# Patient Record
Sex: MALE | Race: WHITE | URBAN - METROPOLITAN AREA
[De-identification: names, ages, dates, MRNs, and addresses within clinical notes are randomized per-mention and may not be internally consistent; named-entity substitution may affect disease eponyms.]

---

## 2018-03-12 ENCOUNTER — HOSPITAL ENCOUNTER (OUTPATIENT)
Facility: AMBULATORY SURGERY CENTER | Age: 63
Discharge: HOME | End: 2018-03-12
Attending: SPECIALIST | Admitting: SPECIALIST
Payer: COMMERCIAL

## 2018-03-12 ENCOUNTER — SURGERY (OUTPATIENT)
Age: 63
End: 2018-03-12

## 2018-03-12 VITALS
BODY MASS INDEX: 26.22 KG/M2 | RESPIRATION RATE: 16 BRPM | WEIGHT: 173 LBS | TEMPERATURE: 98.5 F | HEIGHT: 68 IN | OXYGEN SATURATION: 96 % | SYSTOLIC BLOOD PRESSURE: 138 MMHG | DIASTOLIC BLOOD PRESSURE: 96 MMHG

## 2018-03-12 LAB — COLONOSCOPY: NORMAL

## 2018-03-12 PROCEDURE — G8907 PT DOC NO EVENTS ON DISCHARG: HCPCS

## 2018-03-12 PROCEDURE — G8918 PT W/O PREOP ORDER IV AB PRO: HCPCS

## 2018-03-12 PROCEDURE — 45378 DIAGNOSTIC COLONOSCOPY: CPT

## 2018-03-12 RX ORDER — FENTANYL CITRATE 50 UG/ML
INJECTION, SOLUTION INTRAMUSCULAR; INTRAVENOUS PRN
Status: DISCONTINUED | OUTPATIENT
Start: 2018-03-12 | End: 2018-03-12 | Stop reason: HOSPADM

## 2018-03-12 RX ORDER — LIDOCAINE 40 MG/G
CREAM TOPICAL
Status: DISCONTINUED | OUTPATIENT
Start: 2018-03-12 | End: 2018-03-13 | Stop reason: HOSPADM

## 2018-03-12 RX ORDER — ONDANSETRON 2 MG/ML
4 INJECTION INTRAMUSCULAR; INTRAVENOUS
Status: DISCONTINUED | OUTPATIENT
Start: 2018-03-12 | End: 2018-03-13 | Stop reason: HOSPADM

## 2018-03-12 RX ADMIN — FENTANYL CITRATE 50 MCG: 50 INJECTION, SOLUTION INTRAMUSCULAR; INTRAVENOUS at 09:47

## 2018-03-12 RX ADMIN — FENTANYL CITRATE 50 MCG: 50 INJECTION, SOLUTION INTRAMUSCULAR; INTRAVENOUS at 09:50

## 2018-03-12 NOTE — H&P
Pre-Endoscopy History and Physical     Catracho Mcbride MRN# [id number]   YOB: 1955 Age: 62 year old     Date of Procedure: 3/12/2018  Primary care provider: Alhaji Felder  Type of Endoscopy: Colonoscopy with possible biopsy, possible polypectomy  Reason for Procedure: shistory of polyps  Type of Anesthesia Anticipated: Conscious Sedation    HPI:    Catracho is a 62 year old male who will be undergoing the above procedure.      A history and physical has been performed. The patient's medications and allergies have been reviewed. The risks and benefits of the procedure and the sedation options and risks were discussed with the patient.  All questions were answered and informed consent was obtained.      He denies a personal or family history of anesthesia complications or bleeding disorders.     There is no problem list on file for this patient.       Past Medical History:   Diagnosis Date     Asthma      Hypertension         Past Surgical History:   Procedure Laterality Date     COLONOSCOPY  3/15/2013    Procedure: COLONOSCOPY;  Colonoscopy, follow-up polyps;  Surgeon: Basilio Solis MD;  Location:  OR       Social History   Substance Use Topics     Smoking status: Never Smoker     Smokeless tobacco: Never Used     Alcohol use Yes      Comment: 1 per day        History reviewed. No pertinent family history.    Prior to Admission medications    Medication Sig Start Date End Date Taking? Authorizing Provider   ASPIRIN ADULT LOW STRENGTH PO Take 81 mg by mouth   Yes Reported, Patient   FINASTERIDE PO Take 5 mg by mouth   Yes Reported, Patient   fluticasone-salmeterol (ADVAIR DISKUS) 100-50 MCG/DOSE diskus inhaler Inhale 1 puff into the lungs 2 times daily.   Yes Reported, Patient   ALBUTEROL IN Inhale 1-2 puffs into the lungs 4 times daily as needed.   Yes Reported, Patient   HYDROCHLOROTHIAZIDE Take 25 mg by mouth daily.   Yes Reported, Patient   LORazepam (ATIVAN) 1 MG tablet Take 1 mg by mouth daily as  "needed. Indications: Muscle Spasm    Reported, Patient       No Known Allergies     REVIEW OF SYSTEMS:   5 point ROS negative except as noted above in HPI, including Gen., Resp., CV, GI &  system review.    PHYSICAL EXAM:   BP (!) 137/96  Temp 98.5  F (36.9  C) (Temporal)  Resp 16  Ht 1.727 m (5' 8\")  Wt 78.5 kg (173 lb)  SpO2 96%  BMI 26.3 kg/m2 Estimated body mass index is 26.3 kg/(m^2) as calculated from the following:    Height as of this encounter: 1.727 m (5' 8\").    Weight as of this encounter: 78.5 kg (173 lb).   GENERAL APPEARANCE: alert, and oriented  MENTAL STATUS: alert  AIRWAY EXAM: Mallampatti Class I (visualization of the soft palate, fauces, uvula, anterior and posterior pillars)  RESP: lungs clear to auscultation - no rales, rhonchi or wheezes  CV: regular rates and rhythm  DIAGNOSTICS:    Not indicated    IMPRESSION   ASA Class 2 - Mild systemic disease    PLAN:   Plan for Colonoscopy with possible biopsy, possible polypectomy. We discussed the risks, benefits and alternatives and the patient wished to proceed.    The above has been forwarded to the consulting provider.      Signed Electronically by: Basilio Solis  March 12, 2018          "

## 2018-03-12 NOTE — BRIEF OP NOTE
[facility] Brief Operative Note    Pre-operative diagnosis: fup polyps   Post-operative diagnosis Normal     Procedure: Procedure(s):  Colonoscopy - Wound Class: II-Clean Contaminated   Surgeon(s): Surgeon(s) and Role:     * Basilio Solis MD - Primary   Estimated blood loss: * No values recorded between 3/12/2018 12:00 AM and 3/12/2018 10:11 AM *    Specimens: * No specimens in log *   Findings: Please see ProVation procedure note in Chart Review

## 2018-06-11 ENCOUNTER — HOSPITAL ENCOUNTER (OUTPATIENT)
Facility: AMBULATORY SURGERY CENTER | Age: 63
Discharge: HOME | End: 2018-06-11
Attending: SPECIALIST | Admitting: SPECIALIST
Payer: COMMERCIAL

## 2018-06-11 ENCOUNTER — SURGERY (OUTPATIENT)
Age: 63
End: 2018-06-11

## 2018-06-11 VITALS
RESPIRATION RATE: 16 BRPM | SYSTOLIC BLOOD PRESSURE: 133 MMHG | OXYGEN SATURATION: 95 % | TEMPERATURE: 98.7 F | DIASTOLIC BLOOD PRESSURE: 89 MMHG

## 2018-06-11 LAB — UPPER GI ENDOSCOPY: NORMAL

## 2018-06-11 PROCEDURE — G8918 PT W/O PREOP ORDER IV AB PRO: HCPCS

## 2018-06-11 PROCEDURE — 43239 EGD BIOPSY SINGLE/MULTIPLE: CPT

## 2018-06-11 PROCEDURE — G8907 PT DOC NO EVENTS ON DISCHARG: HCPCS

## 2018-06-11 PROCEDURE — 88305 TISSUE EXAM BY PATHOLOGIST: CPT | Performed by: SPECIALIST

## 2018-06-11 RX ORDER — ONDANSETRON 2 MG/ML
4 INJECTION INTRAMUSCULAR; INTRAVENOUS
Status: DISCONTINUED | OUTPATIENT
Start: 2018-06-11 | End: 2018-06-12 | Stop reason: HOSPADM

## 2018-06-11 RX ORDER — LIDOCAINE 40 MG/G
CREAM TOPICAL
Status: DISCONTINUED | OUTPATIENT
Start: 2018-06-11 | End: 2018-06-12 | Stop reason: HOSPADM

## 2018-06-11 RX ORDER — TRIAMCINOLONE ACETONIDE 1 MG/G
1 CREAM TOPICAL PRN
COMMUNITY
Start: 2017-10-03

## 2018-06-11 RX ORDER — FENTANYL CITRATE 50 UG/ML
INJECTION, SOLUTION INTRAMUSCULAR; INTRAVENOUS PRN
Status: DISCONTINUED | OUTPATIENT
Start: 2018-06-11 | End: 2018-06-11 | Stop reason: HOSPADM

## 2018-06-11 RX ORDER — FLUTICASONE PROPIONATE 50 MCG
2 SPRAY, SUSPENSION (ML) NASAL
COMMUNITY
Start: 2018-05-10

## 2018-06-11 RX ORDER — PANTOPRAZOLE SODIUM 40 MG/1
40 TABLET, DELAYED RELEASE ORAL
COMMUNITY
Start: 2018-05-29

## 2018-06-11 RX ORDER — POTASSIUM CHLORIDE 1500 MG/1
20 TABLET, EXTENDED RELEASE ORAL
COMMUNITY
Start: 2017-10-03

## 2018-06-11 RX ADMIN — FENTANYL CITRATE 50 MCG: 50 INJECTION, SOLUTION INTRAMUSCULAR; INTRAVENOUS at 10:06

## 2018-06-11 RX ADMIN — FENTANYL CITRATE 50 MCG: 50 INJECTION, SOLUTION INTRAMUSCULAR; INTRAVENOUS at 10:09

## 2018-06-11 NOTE — BRIEF OP NOTE
[facility] Brief Operative Note    Pre-operative diagnosis: EGD-Upper abdominal pain/pressure   Post-operative diagnosis LA grade A reflux esophagitis, serpiginous prominent antral fold, granular duodenal bulb     Procedure: Procedure(s):  EGD-Upper abdominal pain/pressure - Wound Class: II-Clean Contaminated   Surgeon(s): Surgeon(s) and Role:     * Basilio Solis MD - Primary   Estimated blood loss: * No values recorded between 6/11/2018 10:05 AM and 6/11/2018 10:35 AM *    Specimens:   ID Type Source Tests Collected by Time Destination   A :  Biopsy Duodenal bulb SURGICAL PATHOLOGY EXAM Basilio Solis MD 6/11/2018 10:16 AM    B :  Biopsy Stomach, Antrum SURGICAL PATHOLOGY EXAM Unique Carmona RN 6/11/2018 10:19 AM    C :  Biopsy Stomach, Body SURGICAL PATHOLOGY EXAM Unique Carmona, RN 6/11/2018 10:21 AM    D :  Biopsy Gastro Esophageal Junction SURGICAL PATHOLOGY EXAM Unique Carmona RN 6/11/2018 10:23 AM       Findings: Please see ProVation procedure note in Chart Review

## 2018-06-11 NOTE — H&P
Pre-Endoscopy History and Physical     Catracho Mcbride MRN# [id number]   YOB: 1955 Age: 62 year old     Date of Procedure: 6/11/2018  Primary care provider: Alhaji Felder  Type of Endoscopy: Gastroscopy with possible biopsy, possible dilation  Reason for Procedure: chest discomfort; all the time, better with a pop & walking around, not increased with exertion. No benefit from antacids, Prilosec.   Type of Anesthesia Anticipated: Conscious Sedation    HPI:    Catracho is a 62 year old male who will be undergoing the above procedure.      A history and physical has been performed. The patient's medications and allergies have been reviewed. The risks and benefits of the procedure and the sedation options and risks were discussed with the patient.  All questions were answered and informed consent was obtained.      He denies a personal or family history of anesthesia complications or bleeding disorders.     There is no problem list on file for this patient.       Past Medical History:   Diagnosis Date     Asthma      Hypertension         Past Surgical History:   Procedure Laterality Date     COLONOSCOPY  3/15/2013    Procedure: COLONOSCOPY;  Colonoscopy, follow-up polyps;  Surgeon: Basilio Solis MD;  Location: [facility] OR     COLONOSCOPY WITH CO2 INSUFFLATION N/A 3/12/2018    Procedure: COLONOSCOPY WITH CO2 INSUFFLATION;  Colonoscopy;  Surgeon: Basilio Solis MD;  Location: [facility] OR       Social History   Substance Use Topics     Smoking status: Never Smoker     Smokeless tobacco: Never Used     Alcohol use Yes      Comment: 1 per day        History reviewed. No pertinent family history.    Prior to Admission medications    Medication Sig Start Date End Date Taking? Authorizing Provider   ALBUTEROL IN Inhale 1-2 puffs into the lungs 4 times daily as needed.   Yes Reported, Patient   FINASTERIDE PO Take 5 mg by mouth   Yes Reported, Patient   fluticasone (FLONASE) 50 MCG/ACT spray 2 sprays 5/10/18  Yes Reported,  "Patient   fluticasone-salmeterol (ADVAIR DISKUS) 100-50 MCG/DOSE diskus inhaler Inhale 1 puff into the lungs 2 times daily.   Yes Reported, Patient   HYDROCHLOROTHIAZIDE Take 25 mg by mouth daily.   Yes Reported, Patient   LORazepam (ATIVAN) 1 MG tablet Take 1 mg by mouth daily as needed. Indications: Muscle Spasm   Yes Reported, Patient   pantoprazole (PROTONIX) 40 MG EC tablet Take 40 mg by mouth 5/29/18  Yes Reported, Patient   potassium chloride SA (K-DUR/KLOR-CON M) 20 MEQ CR tablet Take 20 mEq by mouth 10/3/17  Yes Reported, Patient   triamcinolone (KENALOG) 0.1 % cream Apply 1 Application topically as needed 10/3/17  Yes Reported, Patient   ASPIRIN ADULT LOW STRENGTH PO Take 81 mg by mouth    Reported, Patient       No Known Allergies     REVIEW OF SYSTEMS:   5 point ROS negative except as noted above in HPI, including Gen., Resp., CV, GI &  system review.    PHYSICAL EXAM:   BP (!) 166/98  Temp 98.7  F (37.1  C) (Temporal)  Resp 18  SpO2 97% Estimated body mass index is 26.3 kg/(m^2) as calculated from the following:    Height as of 3/5/18: 1.727 m (5' 8\").    Weight as of 3/5/18: 78.5 kg (173 lb).   GENERAL APPEARANCE: alert, and oriented  MENTAL STATUS: alert  AIRWAY EXAM: Mallampatti Class I (visualization of the soft palate, fauces, uvula, anterior and posterior pillars)  RESP: lungs clear to auscultation - no rales, rhonchi or wheezes  CV: regular rates and rhythm  DIAGNOSTICS:    Not indicated    IMPRESSION   ASA Class 2 - Mild systemic disease    PLAN:   Plan for Gastroscopy with possible biopsy, possible dilation. We discussed the risks, benefits and alternatives and the patient wished to proceed.    The above has been forwarded to the consulting provider.      Signed Electronically by: Basilio Solis  June 11, 2018          "

## 2018-06-13 LAB — COPATH REPORT: NORMAL

## 2023-03-21 ENCOUNTER — TELEPHONE (OUTPATIENT)
Dept: GASTROENTEROLOGY | Facility: CLINIC | Age: 68
End: 2023-03-21
Payer: COMMERCIAL

## 2023-03-21 ENCOUNTER — HOSPITAL ENCOUNTER (OUTPATIENT)
Facility: AMBULATORY SURGERY CENTER | Age: 68
End: 2023-03-21
Attending: SPECIALIST | Admitting: SPECIALIST
Payer: COMMERCIAL

## 2023-03-24 ENCOUNTER — TELEPHONE (OUTPATIENT)
Dept: GASTROENTEROLOGY | Facility: CLINIC | Age: 68
End: 2023-03-24
Payer: COMMERCIAL

## 2023-03-24 NOTE — TELEPHONE ENCOUNTER
Shavon from Dr. Solis office called in to schedule patient at 05/09 [place].     Writer called patient to go over screening questions and registration. Left Voicemail to return call to complete. Okay to transfer to [facility] if available.     Writer will follow up with Shavon for order.

## 2023-03-24 NOTE — TELEPHONE ENCOUNTER
Screening Questions  BLUE  KIND OF PREP RED  LOCATION [review exclusion criteria] GREEN  SEDATION TYPE        N Are you active on mychart?       YAKSHE Ordering/Referring Provider?        [employer] What type of coverage do you have?      N Have you had a positive covid test in the last 14 days?     26.1 1. BMI  [BMI 40+ - review exclusion criteria]    y  2. Are you able to give consent for your medical care? [IF NO,RN REVIEW]          n  3. Are you taking any prescription pain medications on a routine schedule   (ex narcotics: oxycodone, roxicodone, oxycontin,  and percocet)? [RN Review]          3a. EXTENDED PREP What kind of prescription?     n 4. Do you have any chemical dependencies such as alcohol, street drugs, or methadone?        **If yes 3- 5 , please schedule with MAC sedation.**          IF YES TO ANY 6 - 10 - HOSPITAL SETTING ONLY.     N 6.   Do you need assistance transferring?     N 7.   Have you had a heart or lung transplant?    N 8.   Are you currently on dialysis?   N 9.   Do you use daily home oxygen?   N 10. Do you take nitroglycerin?   10a.  If yes, how often?     11. [FEMALES]  NA Are you currently pregnant?    11a.  If yes, how many weeks? [ Greater than 12 weeks, OR NEEDED]    N 12. Do you have Pulmonary Hypertension? *NEED PAC APPT AT [facility] w/ [facility]*     N 13. [review exclusion criteria]  Do you have any implantable devices in your body (pacemaker, defib, LVAD)?    N 14. In the past 6 months, have you had any heart related issues including cardiomyopathy or heart attack?     14a.  If yes, did it require cardiac stenting if so when?     N 15. Have you had a stroke or Transient ischemic attack (TIA - aka  mini stroke ) within 6 months?      N 16. Do you have mod to severe Obstructive Sleep Apnea?  [Hospital only]    N 17. Do you have SEVERE AND UNCONTROLLED asthma? *NEED PAC APPT AT [facility] w/MAC*     18. Are you currently taking any blood thinners?     18a. No. Continue to 19.   18b. Yes/no Blood  "Thinner: No [CONTINUE TO #19]    N 19. Do you take the medication Phentermine?    19a. If yes, \"Hold for 7 days before procedure.  Please consult your prescribing provider if you have questions about holding this medication.\"     N  20. Do you have chronic kidney disease?      N  21. Do you have a diagnosis of diabetes?     N  22. On a regular basis do you go 3-5 days between bowel movements?     N 23. Preferred LOCAL Pharmacy for Pre Prescription    [ LIST ONLY ONE PHARMACY]     [facility] [id number] [place] [facility] [id number] [place] [address] N        - CLOSING REMINDERS -    Informed patient they will need an adult    Cannot take any type of public or medical transportation alone    Conscious Sedation- Needs  for 6 hours after the procedure       MAC/General-Needs  for 24 hours after procedure    Pre-Procedure Covid test to be completed [[facility] PCR Testing Required]    Confirmed Nurse will call to complete assessment       - SCHEDULING DETAILS -  N Hospital Setting Required? If yes, what is the exclusion?:    ELINOR  Surgeon    05/09/2023  Date of Procedure  Lower Endoscopy [Colonoscopy]  [facility]-If you answer yes to questions #8, #20, #21Which Colonoscopy Prep was Sent?     CS Sedation Type     N PAC / Pre-op Required               "

## 2023-03-27 DIAGNOSIS — R69 DIAGNOSIS UNKNOWN: Primary | ICD-10-CM

## 2023-04-15 ENCOUNTER — HEALTH MAINTENANCE LETTER (OUTPATIENT)
Age: 68
End: 2023-04-15

## 2023-04-24 ENCOUNTER — TELEPHONE (OUTPATIENT)
Dept: GASTROENTEROLOGY | Facility: CLINIC | Age: 68
End: 2023-04-24

## 2023-04-24 ENCOUNTER — TELEPHONE (OUTPATIENT)
Dept: GASTROENTEROLOGY | Facility: CLINIC | Age: 68
End: 2023-04-24
Payer: COMMERCIAL

## 2023-04-24 DIAGNOSIS — R19.7 DIARRHEA: Primary | ICD-10-CM

## 2023-04-24 RX ORDER — BISACODYL 5 MG/1
TABLET, DELAYED RELEASE ORAL
Qty: 4 TABLET | Refills: 0 | Status: SHIPPED | OUTPATIENT
Start: 2023-04-24 | End: 2023-04-24

## 2023-04-24 NOTE — TELEPHONE ENCOUNTER
Attempted to contact patient regarding upcoming Colonoscopy  procedure on 05.09.2023 for pre assessment questions. No answer.     Left message to return call to [phone] #4    Discuss Covid policy and designated  policy.    Pre op exam? N/A    Arrival time: 1030. Procedure time: 1115    Facility location: [facility]; [address], [place] [address]    Sedation type: Conscious sedation     Anticoagulants: No    Electronic implanted devices? No    Diabetic? No    Indication for procedure: hx of colon polyps    Bowel prep recommendation: Standard Golytely      Prep instructions sent via letter. Bowel prep script sent to [facility] [id number] [facility] - [place], [place] - [id number] [place] [address]      Lainey Jacinto RN  Endoscopy Procedure Pre Assessment RN

## 2023-04-24 NOTE — TELEPHONE ENCOUNTER
"Patient returned call regarding upcoming colonoscopy procedure scheduled on 5/9/23.    Patient requested to cancel this procedure and would like us to let Dr. Solis know as well.    Patient stated \"My general doctor thinks I have Piriformis syndrome, which is a muscle problem in my buttocks. I'm taking steroids and seeing physical therapy. I need to postpone the colonoscopy for now\".    Patient did mention they noticed prescriptions had been sent into the pharmacy and asking what these are. Informed patient these are the bowel prep prescriptions, but writer will cancel these d/t patient cancelled colonoscopy.     Patient asked \"Is that the Golytely?\". Patient stated \"that is the worst tasting stuff ever, just take that off there. Don't ever send me that again\"    Patient requests Clenpiq for future colonoscopies and stated this is what Dr. Solis recommended for them.    Message sent to octavio holliday to inform of cancellation request.     Message also sent to Dr. Solis notifying of reason for cancellation per patient request. Writer also asked Dr. Solis in message if Clenpiq may be used for future colonoscopies (awaiting response).      Addendum: Britt Poe, RN on 5/1/2023 at 8:43 AM   Have not yet received response from Dr. Solis regarding Clenpiq bowel prep. Forwarded message to [place] RN endo [place].       Britt Poe, RN  Endoscopy Procedure Pre-Assessment RN           "

## 2023-04-24 NOTE — TELEPHONE ENCOUNTER
"Caller:   Reason for Reschedule/Cancellation (please be detailed, any staff messages or encounters to note?): Britt Poe RN  P Endoscopy Scheduling Pool  Hello,     Patient requested to cancel Colonoscopy procedure scheduled on 5/9/23 at [facility].     Patient stated \"My general doctor thinks I have Piriformis syndrome, which is a muscle problem in my buttocks. I'm taking steroids and seeing physical therapy. I need to postpone the colonoscopy for now\".     Dr. Solis has also been notified per patient request.     Patient stated they will call back to reschedule when they are ready.     For future bowel prep, patient requests Clenpiq. RN will need to send these prep instructions.       Thank you,   Britt Poe RN   Endoscopy Procedure Pre-Assessment RN          Prior to reschedule please review:    Ordering Provider:NO ORDER (ELINOR'S OFFICE SCHEDULED)    Sedation per order:CS ON SCREENING QUESTIONS    Does patient have any ASC Exclusions, please identify?:       Notes on Cancelled Procedure:    Procedure:Lower Endoscopy [Colonoscopy]     Date: 5/9    Location:[facility]; [address], [place] [address]    Surgeon: ELINOR        Rescheduled: No.                   "

## 2023-04-27 NOTE — TELEPHONE ENCOUNTER
The patient called to verify that Dr Solis had been notified of the procedure cancellation.  confirmed this, per the note from Britt Poe RN.

## 2023-05-01 NOTE — TELEPHONE ENCOUNTER
"Addendum: Britt Poe RN on 5/3/2023 at 9:06 AM    Approved to use Clenpiq bowel prep for colonoscopy per Dr. Solis.    We can send a message to Dr. Solis to send in the prescription after patient is rescheduled.       \"Britt-     Thanks for letting me know.   Yes, its okay to use Clenpiq bowel prep.   Refer to Clenpiq website for direction on how to take Clenpiq.     Basilio Solis MD\"    \"If necessary, I can send it electronically to the pharmacy when he reschedules\"          Response received from [facility] RN endo pool regarding Clenpiq bowel prep:     Stella Phan RN Mork, Ashley, RN; P Mg Rn Gi Procedures  Cc: Shavon Solis; Basilio Solis MD  Hi,     Unfortunately, Clenpiq is not under our order sets either. I have cc'd Shavon on this to assist in getting it prescribed.     Thank you,     JOSE L Douglas           Previous Messages       ----- Message -----   From: Britt Poe RN   Sent: 5/1/2023   8:41 AM CDT   To: [facility] Rn Gi Procedures   Subject: FW: Clenpiq prep? - Cancelled Colonoscopy         Hello [facility] staff,     I did not receive a response from Dr. Solis on this.     The patient will be postponing his colonoscopy but requests Clenpiq for bowel prep -  is this something you all are able to assist with ordering? We do not have Clenpiq in our order sets. Just want to make sure the patient will have the correct prep when they are rescheduled. He was pretty upset that the Golytely was initially sent out, per protocol.     Thank you!   Britt Poe RN   Endoscopy Procedure Pre-Assessment RN         ----- Message -----   From: Britt Poe RN   Sent: 4/24/2023   3:48 PM CDT   To: Basilio Solis MD   Subject: Cancelled Colonoscopy                             Hello,     FYI: Patient cancelled colonoscopy scheduled for 5/9/23.     Patient stated \"My general doctor thinks I have Piriformis syndrome, which is a muscle problem in my buttocks. I'm taking steroids and seeing physical therapy. I need to " "postpone the colonoscopy for now\".     Patient wanted us to let you know.       For the future, patient requests Clenpiq for bowel prep. May we send in Clenpiq bowel prep for future colonoscopies?       Thank you!   Britt Poe, JOSE L   Endoscopy Procedure Pre-Assessment RN      Britt Poe, RN  Endoscopy Procedure Pre-Assessment RN    "

## 2024-06-16 ENCOUNTER — HEALTH MAINTENANCE LETTER (OUTPATIENT)
Age: 69
End: 2024-06-16

## (undated) DEVICE — SOL WATER IRRIG 1000ML BOTTLE 07139-09

## (undated) RX ORDER — SIMETHICONE 40MG/0.6ML
SUSPENSION, DROPS(FINAL DOSAGE FORM)(ML) ORAL
Status: DISPENSED
Start: 2018-06-11

## (undated) RX ORDER — FENTANYL CITRATE 50 UG/ML
INJECTION, SOLUTION INTRAMUSCULAR; INTRAVENOUS
Status: DISPENSED
Start: 2018-03-12

## (undated) RX ORDER — FENTANYL CITRATE 50 UG/ML
INJECTION, SOLUTION INTRAMUSCULAR; INTRAVENOUS
Status: DISPENSED
Start: 2018-06-11